# Patient Record
Sex: FEMALE | Race: WHITE | NOT HISPANIC OR LATINO | Employment: OTHER | URBAN - METROPOLITAN AREA
[De-identification: names, ages, dates, MRNs, and addresses within clinical notes are randomized per-mention and may not be internally consistent; named-entity substitution may affect disease eponyms.]

---

## 2021-12-19 DIAGNOSIS — Z11.59 ENCOUNTER FOR SCREENING FOR OTHER VIRAL DISEASES: ICD-10-CM

## 2022-01-12 RX ORDER — ERGOCALCIFEROL 1.25 MG/1
50000 CAPSULE, LIQUID FILLED ORAL DAILY
COMMUNITY

## 2022-01-12 RX ORDER — MELATONIN 10 MG
6-10 CAPSULE ORAL
COMMUNITY

## 2022-01-12 RX ORDER — MULTIPLE VITAMINS W/ MINERALS TAB 9MG-400MCG
1 TAB ORAL DAILY
COMMUNITY

## 2022-01-13 ENCOUNTER — HOSPITAL ENCOUNTER (OUTPATIENT)
Facility: CLINIC | Age: 52
Discharge: HOME | End: 2022-01-13
Attending: OTOLARYNGOLOGY | Admitting: OTOLARYNGOLOGY

## 2022-01-13 ENCOUNTER — ANESTHESIA (OUTPATIENT)
Dept: SURGERY | Facility: CLINIC | Age: 52
End: 2022-01-13

## 2022-01-13 ENCOUNTER — ANESTHESIA EVENT (OUTPATIENT)
Dept: SURGERY | Facility: CLINIC | Age: 52
End: 2022-01-13

## 2022-01-13 VITALS
WEIGHT: 113 LBS | DIASTOLIC BLOOD PRESSURE: 98 MMHG | HEART RATE: 82 BPM | BODY MASS INDEX: 20.02 KG/M2 | OXYGEN SATURATION: 97 % | RESPIRATION RATE: 16 BRPM | HEIGHT: 63 IN | SYSTOLIC BLOOD PRESSURE: 152 MMHG | TEMPERATURE: 98.3 F

## 2022-01-13 DIAGNOSIS — R23.8 FACIAL AGING: Primary | ICD-10-CM

## 2022-01-13 PROCEDURE — 272N000001 HC OR GENERAL SUPPLY STERILE: Performed by: OTOLARYNGOLOGY

## 2022-01-13 PROCEDURE — 250N000011 HC RX IP 250 OP 636: Performed by: OTOLARYNGOLOGY

## 2022-01-13 PROCEDURE — 250N000011 HC RX IP 250 OP 636: Performed by: ANESTHESIOLOGY

## 2022-01-13 PROCEDURE — 999N000141 HC STATISTIC PRE-PROCEDURE NURSING ASSESSMENT: Performed by: OTOLARYNGOLOGY

## 2022-01-13 PROCEDURE — 250N000011 HC RX IP 250 OP 636: Performed by: NURSE ANESTHETIST, CERTIFIED REGISTERED

## 2022-01-13 PROCEDURE — 360N000076 HC SURGERY LEVEL 3, PER MIN: Performed by: OTOLARYNGOLOGY

## 2022-01-13 PROCEDURE — 710N000009 HC RECOVERY PHASE 1, LEVEL 1, PER MIN: Performed by: OTOLARYNGOLOGY

## 2022-01-13 PROCEDURE — 370N000017 HC ANESTHESIA TECHNICAL FEE, PER MIN: Performed by: OTOLARYNGOLOGY

## 2022-01-13 PROCEDURE — 250N000009 HC RX 250: Performed by: OTOLARYNGOLOGY

## 2022-01-13 PROCEDURE — 250N000009 HC RX 250: Performed by: ANESTHESIOLOGY

## 2022-01-13 PROCEDURE — 250N000009 HC RX 250: Performed by: NURSE ANESTHETIST, CERTIFIED REGISTERED

## 2022-01-13 PROCEDURE — 258N000003 HC RX IP 258 OP 636: Performed by: ANESTHESIOLOGY

## 2022-01-13 PROCEDURE — 258N000003 HC RX IP 258 OP 636: Performed by: OTOLARYNGOLOGY

## 2022-01-13 PROCEDURE — 258N000001 HC RX 258: Performed by: OTOLARYNGOLOGY

## 2022-01-13 PROCEDURE — 710N000012 HC RECOVERY PHASE 2, PER MINUTE: Performed by: OTOLARYNGOLOGY

## 2022-01-13 PROCEDURE — 250N000013 HC RX MED GY IP 250 OP 250 PS 637: Performed by: OTOLARYNGOLOGY

## 2022-01-13 DEVICE — IMPLANTABLE DEVICE: Type: IMPLANTABLE DEVICE | Site: FACE | Status: FUNCTIONAL

## 2022-01-13 RX ORDER — MEPERIDINE HYDROCHLORIDE 25 MG/ML
12.5 INJECTION INTRAMUSCULAR; INTRAVENOUS; SUBCUTANEOUS
Status: DISCONTINUED | OUTPATIENT
Start: 2022-01-13 | End: 2022-01-13 | Stop reason: HOSPADM

## 2022-01-13 RX ORDER — BACITRACIN 500 [USP'U]/G
OINTMENT OPHTHALMIC PRN
Status: DISCONTINUED | OUTPATIENT
Start: 2022-01-13 | End: 2022-01-13 | Stop reason: HOSPADM

## 2022-01-13 RX ORDER — MAGNESIUM HYDROXIDE 1200 MG/15ML
LIQUID ORAL PRN
Status: DISCONTINUED | OUTPATIENT
Start: 2022-01-13 | End: 2022-01-13 | Stop reason: HOSPADM

## 2022-01-13 RX ORDER — ONDANSETRON 4 MG/1
4 TABLET, ORALLY DISINTEGRATING ORAL EVERY 30 MIN PRN
Status: DISCONTINUED | OUTPATIENT
Start: 2022-01-13 | End: 2022-01-13 | Stop reason: HOSPADM

## 2022-01-13 RX ORDER — ONDANSETRON 2 MG/ML
INJECTION INTRAMUSCULAR; INTRAVENOUS PRN
Status: DISCONTINUED | OUTPATIENT
Start: 2022-01-13 | End: 2022-01-13

## 2022-01-13 RX ORDER — OXYCODONE HYDROCHLORIDE 5 MG/1
5 TABLET ORAL EVERY 4 HOURS PRN
Status: DISCONTINUED | OUTPATIENT
Start: 2022-01-13 | End: 2022-01-13 | Stop reason: HOSPADM

## 2022-01-13 RX ORDER — BUPIVACAINE HYDROCHLORIDE 5 MG/ML
INJECTION, SOLUTION EPIDURAL; INTRACAUDAL
Status: DISCONTINUED
Start: 2022-01-13 | End: 2022-01-13 | Stop reason: HOSPADM

## 2022-01-13 RX ORDER — SODIUM CHLORIDE, SODIUM LACTATE, POTASSIUM CHLORIDE, CALCIUM CHLORIDE 600; 310; 30; 20 MG/100ML; MG/100ML; MG/100ML; MG/100ML
INJECTION, SOLUTION INTRAVENOUS CONTINUOUS
Status: DISCONTINUED | OUTPATIENT
Start: 2022-01-13 | End: 2022-01-13 | Stop reason: HOSPADM

## 2022-01-13 RX ORDER — PROPOFOL 10 MG/ML
INJECTION, EMULSION INTRAVENOUS PRN
Status: DISCONTINUED | OUTPATIENT
Start: 2022-01-13 | End: 2022-01-13

## 2022-01-13 RX ORDER — EPINEPHRINE 1 MG/ML
INJECTION, SOLUTION INTRAMUSCULAR; SUBCUTANEOUS
Status: DISCONTINUED
Start: 2022-01-13 | End: 2022-01-13 | Stop reason: HOSPADM

## 2022-01-13 RX ORDER — FENTANYL CITRATE 0.05 MG/ML
50 INJECTION, SOLUTION INTRAMUSCULAR; INTRAVENOUS
Status: DISCONTINUED | OUTPATIENT
Start: 2022-01-13 | End: 2022-01-13 | Stop reason: HOSPADM

## 2022-01-13 RX ORDER — KETAMINE HYDROCHLORIDE 10 MG/ML
INJECTION INTRAMUSCULAR; INTRAVENOUS PRN
Status: DISCONTINUED | OUTPATIENT
Start: 2022-01-13 | End: 2022-01-13

## 2022-01-13 RX ORDER — CHLORHEXIDINE GLUCONATE ORAL RINSE 1.2 MG/ML
SOLUTION DENTAL PRN
Status: DISCONTINUED | OUTPATIENT
Start: 2022-01-13 | End: 2022-01-13 | Stop reason: HOSPADM

## 2022-01-13 RX ORDER — ONDANSETRON 2 MG/ML
4 INJECTION INTRAMUSCULAR; INTRAVENOUS
Status: DISCONTINUED | OUTPATIENT
Start: 2022-01-13 | End: 2022-01-13 | Stop reason: HOSPADM

## 2022-01-13 RX ORDER — BUPIVACAINE HYDROCHLORIDE AND EPINEPHRINE 5; 5 MG/ML; UG/ML
INJECTION, SOLUTION PERINEURAL PRN
Status: DISCONTINUED | OUTPATIENT
Start: 2022-01-13 | End: 2022-01-13 | Stop reason: HOSPADM

## 2022-01-13 RX ORDER — GINSENG 100 MG
CAPSULE ORAL
Status: DISCONTINUED
Start: 2022-01-13 | End: 2022-01-13 | Stop reason: HOSPADM

## 2022-01-13 RX ORDER — CEFAZOLIN SODIUM 2 G/100ML
2 INJECTION, SOLUTION INTRAVENOUS SEE ADMIN INSTRUCTIONS
Status: DISCONTINUED | OUTPATIENT
Start: 2022-01-13 | End: 2022-01-13 | Stop reason: HOSPADM

## 2022-01-13 RX ORDER — FENTANYL CITRATE 50 UG/ML
25 INJECTION, SOLUTION INTRAMUSCULAR; INTRAVENOUS
Status: DISCONTINUED | OUTPATIENT
Start: 2022-01-13 | End: 2022-01-13 | Stop reason: HOSPADM

## 2022-01-13 RX ORDER — ONDANSETRON 2 MG/ML
4 INJECTION INTRAMUSCULAR; INTRAVENOUS EVERY 30 MIN PRN
Status: DISCONTINUED | OUTPATIENT
Start: 2022-01-13 | End: 2022-01-13 | Stop reason: HOSPADM

## 2022-01-13 RX ORDER — FENTANYL CITRATE 50 UG/ML
INJECTION, SOLUTION INTRAMUSCULAR; INTRAVENOUS PRN
Status: DISCONTINUED | OUTPATIENT
Start: 2022-01-13 | End: 2022-01-13

## 2022-01-13 RX ORDER — EPHEDRINE SULFATE 50 MG/ML
INJECTION, SOLUTION INTRAMUSCULAR; INTRAVENOUS; SUBCUTANEOUS PRN
Status: DISCONTINUED | OUTPATIENT
Start: 2022-01-13 | End: 2022-01-13

## 2022-01-13 RX ORDER — HYDROMORPHONE HCL IN WATER/PF 6 MG/30 ML
0.2 PATIENT CONTROLLED ANALGESIA SYRINGE INTRAVENOUS EVERY 5 MIN PRN
Status: DISCONTINUED | OUTPATIENT
Start: 2022-01-13 | End: 2022-01-13 | Stop reason: HOSPADM

## 2022-01-13 RX ORDER — FENTANYL CITRATE 50 UG/ML
50 INJECTION, SOLUTION INTRAMUSCULAR; INTRAVENOUS EVERY 5 MIN PRN
Status: DISCONTINUED | OUTPATIENT
Start: 2022-01-13 | End: 2022-01-13 | Stop reason: HOSPADM

## 2022-01-13 RX ORDER — LIDOCAINE HYDROCHLORIDE 20 MG/ML
INJECTION, SOLUTION INFILTRATION; PERINEURAL PRN
Status: DISCONTINUED | OUTPATIENT
Start: 2022-01-13 | End: 2022-01-13

## 2022-01-13 RX ORDER — PROPOFOL 10 MG/ML
INJECTION, EMULSION INTRAVENOUS CONTINUOUS PRN
Status: DISCONTINUED | OUTPATIENT
Start: 2022-01-13 | End: 2022-01-13

## 2022-01-13 RX ORDER — CEFAZOLIN SODIUM 2 G/100ML
2 INJECTION, SOLUTION INTRAVENOUS
Status: COMPLETED | OUTPATIENT
Start: 2022-01-13 | End: 2022-01-13

## 2022-01-13 RX ORDER — LIDOCAINE HYDROCHLORIDE 10 MG/ML
INJECTION, SOLUTION EPIDURAL; INFILTRATION; INTRACAUDAL; PERINEURAL
Status: DISCONTINUED
Start: 2022-01-13 | End: 2022-01-13 | Stop reason: HOSPADM

## 2022-01-13 RX ADMIN — Medication 12 MCG: at 14:52

## 2022-01-13 RX ADMIN — FENTANYL CITRATE 100 MCG: 50 INJECTION, SOLUTION INTRAMUSCULAR; INTRAVENOUS at 07:40

## 2022-01-13 RX ADMIN — PROPOFOL 30 MG: 10 INJECTION, EMULSION INTRAVENOUS at 14:59

## 2022-01-13 RX ADMIN — ONDANSETRON 4 MG: 2 INJECTION INTRAMUSCULAR; INTRAVENOUS at 16:21

## 2022-01-13 RX ADMIN — FENTANYL CITRATE 50 MCG: 50 INJECTION, SOLUTION INTRAMUSCULAR; INTRAVENOUS at 11:24

## 2022-01-13 RX ADMIN — Medication 15 MG: at 13:15

## 2022-01-13 RX ADMIN — Medication 5 MG: at 08:08

## 2022-01-13 RX ADMIN — PROPOFOL 30 MG: 10 INJECTION, EMULSION INTRAVENOUS at 12:43

## 2022-01-13 RX ADMIN — LIDOCAINE HYDROCHLORIDE 100 MG: 20 INJECTION, SOLUTION INFILTRATION; PERINEURAL at 07:40

## 2022-01-13 RX ADMIN — Medication 15 MG: at 14:09

## 2022-01-13 RX ADMIN — FENTANYL CITRATE 50 MCG: 50 INJECTION, SOLUTION INTRAMUSCULAR; INTRAVENOUS at 09:39

## 2022-01-13 RX ADMIN — ROCURONIUM BROMIDE 50 MG: 50 INJECTION, SOLUTION INTRAVENOUS at 07:41

## 2022-01-13 RX ADMIN — PHENYLEPHRINE HYDROCHLORIDE 100 MCG: 10 INJECTION INTRAVENOUS at 07:55

## 2022-01-13 RX ADMIN — Medication 8 MCG: at 13:01

## 2022-01-13 RX ADMIN — CEFAZOLIN SODIUM 2 G: 2 INJECTION, SOLUTION INTRAVENOUS at 15:25

## 2022-01-13 RX ADMIN — PROPOFOL 50 MG: 10 INJECTION, EMULSION INTRAVENOUS at 07:42

## 2022-01-13 RX ADMIN — PROPOFOL 150 MG: 10 INJECTION, EMULSION INTRAVENOUS at 07:40

## 2022-01-13 RX ADMIN — ONDANSETRON 4 MG: 2 INJECTION INTRAMUSCULAR; INTRAVENOUS at 15:50

## 2022-01-13 RX ADMIN — HYDROMORPHONE HYDROCHLORIDE 0.2 MG: 0.2 INJECTION, SOLUTION INTRAMUSCULAR; INTRAVENOUS; SUBCUTANEOUS at 16:58

## 2022-01-13 RX ADMIN — SODIUM CHLORIDE, POTASSIUM CHLORIDE, SODIUM LACTATE AND CALCIUM CHLORIDE: 600; 310; 30; 20 INJECTION, SOLUTION INTRAVENOUS at 06:35

## 2022-01-13 RX ADMIN — ONDANSETRON 4 MG: 2 INJECTION INTRAMUSCULAR; INTRAVENOUS at 18:02

## 2022-01-13 RX ADMIN — CEFAZOLIN SODIUM 2 G: 2 INJECTION, SOLUTION INTRAVENOUS at 11:25

## 2022-01-13 RX ADMIN — SODIUM CHLORIDE, POTASSIUM CHLORIDE, SODIUM LACTATE AND CALCIUM CHLORIDE: 600; 310; 30; 20 INJECTION, SOLUTION INTRAVENOUS at 15:15

## 2022-01-13 RX ADMIN — HYDROMORPHONE HYDROCHLORIDE 0.2 MG: 0.2 INJECTION, SOLUTION INTRAMUSCULAR; INTRAVENOUS; SUBCUTANEOUS at 17:25

## 2022-01-13 RX ADMIN — PROPOFOL 20 MG: 10 INJECTION, EMULSION INTRAVENOUS at 13:10

## 2022-01-13 RX ADMIN — PROPOFOL 200 MCG/KG/MIN: 10 INJECTION, EMULSION INTRAVENOUS at 07:40

## 2022-01-13 RX ADMIN — MIDAZOLAM 2 MG: 1 INJECTION INTRAMUSCULAR; INTRAVENOUS at 07:29

## 2022-01-13 RX ADMIN — HYDROMORPHONE HYDROCHLORIDE 0.5 MG: 1 INJECTION, SOLUTION INTRAMUSCULAR; INTRAVENOUS; SUBCUTANEOUS at 10:48

## 2022-01-13 RX ADMIN — SODIUM CHLORIDE, POTASSIUM CHLORIDE, SODIUM LACTATE AND CALCIUM CHLORIDE: 600; 310; 30; 20 INJECTION, SOLUTION INTRAVENOUS at 10:36

## 2022-01-13 RX ADMIN — CEFAZOLIN SODIUM 2 G: 2 INJECTION, SOLUTION INTRAVENOUS at 07:28

## 2022-01-13 ASSESSMENT — LIFESTYLE VARIABLES: TOBACCO_USE: 0

## 2022-01-13 ASSESSMENT — ENCOUNTER SYMPTOMS
DYSRHYTHMIAS: 0
SEIZURES: 0
ORTHOPNEA: 0

## 2022-01-13 ASSESSMENT — COPD QUESTIONNAIRES: COPD: 0

## 2022-01-13 ASSESSMENT — MIFFLIN-ST. JEOR: SCORE: 1106.69

## 2022-01-13 NOTE — BRIEF OP NOTE
[facility] Brief Operative Note    Pre-operative diagnosis: Facial aging [R23.8]   Post-operative diagnosis Same   Procedure: Procedure(s):  COSMETIC FACELIFT  COSMETIC NECK LIFT  COSMETIC ENDOSCOPIC BROWLIFT  COSMETIC FAT TRANSFER FROM TRUNK TO FACE   Surgeon: Richard Floyd MD   Assistants(s): None   Estimated blood loss: 50 cc    Specimens: Facial and neck skin, not sent   Findings: 2- Endotine 3,5mm Forehead fixation devices placed:  2- 7mm flat Jesus-Su drains place post-auricularly     Complications: none  Condition at end of case: good

## 2022-01-13 NOTE — OR NURSING
"Pt complaining that \"this is not going well\". I asked her what is not going well and she said the crackers are too dry and she needs water and that I am too loud.  "

## 2022-01-13 NOTE — ANESTHESIA PREPROCEDURE EVALUATION
Anesthesia Pre-Procedure Evaluation    Patient: Shonda Rodriguez   MRN: [id number] : 1970        Preoperative Diagnosis: Facial aging [R23.8]    Procedure : Procedure(s):  COSMETIC FACELIFT  COSMETIC NECK LIFT  COSMETIC ENDOSCOPIC BROWLIFT  COSMETIC FAT TRANSFER FROM TRUNK TO FACE          Past Medical History:   Diagnosis Date     Acute diffuse otitis externa of both ears      De Quervain's tenosynovitis, left wrist      Lumbar stenosis      Premature menopause       Past Surgical History:   Procedure Laterality Date     D & C       EXCISE GANGLION HAND      wrist     HEMILAMINOTOMY LUMBAR SPINE       LAPAROSCOPY DIAGNOSTIC (GENERAL)       RHINOPLASTY        No Known Allergies   Social History     Tobacco Use     Smoking status: Former Smoker     Types: Cigarettes     Smokeless tobacco: Never Used   Substance Use Topics     Alcohol use: Yes     Comment: 1-2 per mo      Wt Readings from Last 1 Encounters:   No data found for Wt        Prior to Admission medications    Medication Sig Start Date End Date Taking? Authorizing Provider   DHEA 10 MG CAPS Take 2 capsules by mouth daily   Yes Reported, Patient   Melatonin 10 MG CAPS Take 6-10 mg by mouth nightly as needed   Yes Reported, Patient   Minocycline HCl (MINOCIN PO)    Yes Reported, Patient   Multiple Vitamins-Minerals (ZINC PO) Take 140 mg by mouth daily   Yes Reported, Patient   multivitamin w/minerals (MULTI-VITAMIN) tablet Take 1 tablet by mouth daily   Yes Reported, Patient   vitamin D2 (ERGOCALCIFEROL) 86591 units (1250 mcg) capsule Take 50,000 Units by mouth daily   Yes Reported, Patient     Anesthesia Evaluation   Pt has had prior anesthetic. Type: General.    No history of anesthetic complications       ROS/MED HX  ENT/Pulmonary:     (+) allergic rhinitis,  (-) tobacco use, asthma, COPD, sleep apnea and recent URI   Neurologic:    (-) no seizures and no CVA   Cardiovascular:    (-) angina, hypertension, CAD, CHF, [place], orthopnea/PND, syncope,  arrhythmias, irregular heartbeat/palpitations, valvular problems/murmurs, angina, murmur and wheezes   METS/Exercise Tolerance: >4 METS    Hematologic:     (+) history of blood transfusion, no previous transfusion reaction,     Musculoskeletal: Comment: S/p Cervical Disc Replacement C5-6      GI/Hepatic:    (-) GERD and liver disease   Renal/Genitourinary:    (-) renal disease   Endo:    (-) Type II DM, thyroid disease and chronic steroid usage   Psychiatric/Substance Use:    (-) alcohol abuse history   Infectious Disease:       Malignancy:       Other:            Physical Exam    Airway        Mallampati: I   TM distance: > 3 FB   Neck ROM: full   Mouth opening: > 3 cm    Respiratory Devices and Support         Dental     Comment: Veneers      B=Bridge, C=Chipped, L=Loose, M=Missing    Cardiovascular          Rhythm and rate: regular and normal (-) no murmur    Pulmonary           breath sounds clear to auscultation   (-) no rhonchi and no wheezes        OUTSIDE LABS:    Anesthesia Plan    ASA Status:  1   NPO Status:  NPO Appropriate    Anesthesia Type: General.     - Airway: ETT   Induction: Propofol, Intravenous.   Maintenance: TIVA.        Consents    Anesthesia Plan(s) and associated risks, benefits, and realistic alternatives discussed. Questions answered and patient/representative(s) expressed understanding.    - Discussed:     - Discussed with:  Patient         Postoperative Care    Pain management: Multi-modal analgesia.   PONV prophylaxis: Ondansetron (or other 5HT-3), Dexamethasone or Solumedrol     Comments:    Other Comments: Surgeon requests TIVA   Zofran 8 mg (divided over last 30 minutes of case)     Pt requests to not have Fentanyl in recovery            Dago Reich MD

## 2022-01-13 NOTE — ANESTHESIA CARE TRANSFER NOTE
Patient: Shonda Rodriguez    Procedure: Procedure(s):  COSMETIC FACELIFT  COSMETIC NECK LIFT  COSMETIC ENDOSCOPIC BROWLIFT  COSMETIC FAT TRANSFER FROM TRUNK TO FACE       Diagnosis: Facial aging [R23.8]  Diagnosis Additional Information: No value filed.    Anesthesia Type:   General     Note:    Oropharynx: oropharynx clear of all foreign objects and spontaneously breathing  Level of Consciousness: awake  Oxygen Supplementation: face mask  Level of Supplemental Oxygen (L/min / FiO2): 6  Independent Airway: airway patency satisfactory and stable  Dentition: dentition unchanged  Vital Signs Stable: post-procedure vital signs reviewed and stable  Report to RN Given: handoff report given  Patient transferred to: PACU  Comments: Neuromuscular blockade not used after succinylcholine for intubation, spontaneous return of TOF 4/4 with sustained tetany, spontaneous respirations, adequate tidal volumes, followed commands to voice, oropharynx suctioned with soft flexible catheter, extubated atraumatically, extubated with suction, airway patent after extubation.  Oxygen via facemask at 6 liters per minute to PACU. Oxygen tubing connected to wall O2 in PACU, SpO2, NiBP, and EKG monitors and alarms on and functioning, Juanis Hugger warmer connected to patient gown, report on patient's clinical status given to PACU RN, RN questions answered.     Handoff Report: Identifed the Patient, Identified the Reponsible Provider, Reviewed the pertinent medical history, Discussed the surgical course, Reviewed Intra-OP anesthesia mangement and issues during anesthesia, Set expectations for post-procedure period and Allowed opportunity for questions and acknowledgement of understanding      Vitals:  Vitals Value Taken Time   /98 01/13/22 1631   Temp     Pulse 70 01/13/22 1637   Resp 7 01/13/22 1637   SpO2 100 % 01/13/22 1637   Vitals shown include unvalidated device data.    Electronically Signed By: VINCENT Toney CRNA  January  13, 2022  4:38 PM

## 2022-01-13 NOTE — ANESTHESIA PROCEDURE NOTES
Airway       Patient location during procedure: OR       Procedure Start/Stop Times: 1/13/2022 7:46 AM  Staff -        Anesthesiologist:  Dago Reich MD       CRNA: Franny Carey APRN CRNA       Other Anesthesia Staff: Antonietta Cunningham       Performed By: anesthesiologist, CRNA and SRNA  Consent for Airway        Urgency: elective  Indications and Patient Condition       Indications for airway management: katelyn-procedural       Induction type:intravenous       Mask difficulty assessment: 1 - vent by mask    Final Airway Details       Final airway type: endotracheal airway       Successful airway: ETT - single  Endotracheal Airway Details        ETT size (mm): 7.0       Cuffed: yes       Successful intubation technique: video laryngoscopy       VL Blade Size: Glidescope 3       Grade View of Cords: 1       Adjucts: stylet       Position: Right       Measured from: gums/teeth       Secured at (cm): 20       Bite block used: None    Post intubation assessment        Placement verified by: capnometry, equal breath sounds and chest rise        Number of attempts at approach: 1       Number of other approaches attempted: 0       Secured with: other (comment) (Surgeon sutured ETT to front tooth)       Ease of procedure: easy       Dentition: Intact and Unchanged

## 2022-01-13 NOTE — OR NURSING
Pt has been nauseated on and off. After cracker (couple bites) c/o nausea again. zofran given. Surgeon at bedside talking to pt.

## 2022-01-14 NOTE — OR NURSING
Pt and  notified that they are not to drain the radha drains but to call surgeon if they fill with blood.

## 2022-01-14 NOTE — OR NURSING
"Throughout care pt has been very condescending, demeaning and rude to staff.      as noted in note from note at 1758 when pt c/o my talking too loudly, when surgeon spoke louder to her closer to her ear she did not complain about him speaking loudly.     Pt would state \"well get me someone who knows\" when I explained that I was not sure about the length of time of effect of IV zofran telling her that it affects everyone differently. I did get her the information as I had told her I would.    Pt complaining of the housekeeping staff when they were doing their job.    After talking to Dr. Kahn about nausea meds for the current episode pt said, \"I don't know why you are telling people that I get nauseated with surgeries. This is the first time this has ever happened after any of the surgeries I have ever done.\" I advised her that I did not say anything resembling that and I was only discussing the current situation.    Throughout care when pt was complaining of nausea or pain or trying to decide what to eat she would say, again in rude fashion, \"I don't know what I want\". I would give her choices and wait for her to decide on her choice.     Throughout care I attempted to use therapeutic communication but it did not seem to make a difference.     This behavior was noted by many staff members   "

## 2022-01-14 NOTE — OR NURSING
"Pt was nauseaterd and Dr riley notified. Ordered compazine 5 mg. As Iwas about to give it pt refused any more meds saying \"I just want to go, this is going south with every intervention that you do\".   "

## 2022-01-14 NOTE — OP NOTE
Procedure Date: 01/13/2022    SURGEON:  Richard Floyd MD.     PREOPERATIVE DIAGNOSIS:  Facial aging.    POSTOPERATIVE DIAGNOSIS:  Facial aging.    PROCEDURES PERFORMED:    1.  Endoscopic brow lift.  2.  Facelift and neck lift.  3.  Truncal fat transfer to the face.    ANESTHESIA:  IV general anesthesia.    ESTIMATED BLOOD LOSS:  50 mL.     COMPLICATIONS:  There were no complications.    IMPLANTS PLACED:  Two 3.5 mm Endotine brow fixation devices and two 7 mm flat Jesus-Su drains placed behind each ear.    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  She has had a previous anterior cervical disk operation through the front of her right neck and then also she has had 3 treatments of Kybella in the past.  The risks, benefits serious and significant complications and alternatives to the above-named procedures were explained to the patient and informed consent in the office. Today she had preoperative markings of her pre and postauricular incisions, her submental incision, the markings for fat transfer on her face, also she had markings for the origin of the supratrochlear and supraorbital nerve and then she had markings in the paramedian and temple incisions for her brow lift.    The patient was then taken to the operating room, placed supine on the operating table, induced under general endotracheal anesthesia.  She was then prepped and draped from the xiphoid down to below the knees for the abdominal fat harvest.  Four puncture sites were made in the umbilical area and then 10 mL of fat was then harvested for each of the 4 quadrants.  Then, the 4 umbilical incisions were closed with a 6-0 fast absorbing gut.  After this was done, 2 puncture wounds with 16-gauge needle were done on both the inner and outer thigh on each leg and then 3 mL were harvested from the inner thigh  compartment on each side and 3 mL of fat was then harvested from the lateral thigh compartment for a total of 12 mL harvested from the inner and outer thighs total.  There were no sutures through the puncture wounds and these were just covered with Steri-Strips.  The fat was then processed with the centrifuge and then the patient's face was then prepped and draped.  It should be mentioned that the patient had a nasal swab with Betadine for 10 seconds on each nostril x2 and the patient also had chlorhexidine oral rinse.  At this time, blunt fat transfer cannulas were used to transfer 5 mL into the area of the chin region, 1 mL to the upper lip, 1 mL of the lower lip, 1 mL to each of the anterior medial cheeks, 1 mL to the lateral cheeks and 1 mL to the nasolabial fold at the base of the nose.  The patient also had 0.5 mL placed in each of the ear lobes in the lower portion on both sides.  It should be mentioned, the patient has a previous cleft of the right earlobe.  The patient also had the remaining fat converted to nanofat and 4 mL of nanofat was placed with a 25-gauge needle with 2 mL in the upper lip and 2 mL in the lower lip.    After this was done, the patient was prepared for an endoscopic brow lift with local injection at the superior orbital margin and also at the 4 scalp incisions.  Two scalp incisions were made in the paramedian area.  Two scalp incisions were made in the temple region.  In the paramedian areas, the dissection was carried under the periosteum to within 2 cm of the orbital rim on both sides and then brought laterally to the conjoined tendon. In the temple region, the incision was carried down to the deep temporal fascia and at this time, a dissection through the deep temporal fascias of the conjoined fascia was done and the dissection pockets were joined.  The orbital ligaments were released on the lateral aspect of each of the orbits and then dissection of the arcus marginalis was done  with careful preservation of the supraorbital nerve roots.  At this time, no muscle work was done.  There was just elevation of the central muscular segment between the eyebrows, but again no cutting or cautery was done in this region.  At this time, the lateral brows were raised with an Endotine fixation device, 3.5 mm, placed into the bone on either right and left side and then the brows were elevated and then secured onto the Endotine fixation device.  The wound was closed in 2 layers with 4-0 Vicryl suture and a staple closure. In the lateral temporal region, the superficial temporal fascia was advanced on the deep temporal fascia with a 2-0 Vicryl suture and then a stapled closure was done.  There was good elevation of both of the brows.  At this time, the attention was then directed to the neck.  The neck was injected with tumescent anesthesia and then an incision was made in the submental crease.  The dissection was done in the subcutaneous plane immediately above the platysma muscles and was carried down from the chin area down to the sternal notch.  It should be mentioned that the patient had a prior anterior neck cervical disk surgery and then the incision was still fresh and there is some scarring in this region.  There is also some scarring immediately in the submental region from the previously placed Kybella.  All this was raised with the subcutaneous plane and then the platysmal muscle bellies were exposed.  At this time, 3-0 PDS was then used to secure the muscle from the chin down to the suprasternal notch and then back upward to the menton.  This was done and as a Bridges style platysmaplasty.  Following this, the dissection was carried laterally to the anterior border of the sternocleidomastoid and the procedure was then paused as the facelift was performed.  The face of procedure was begun on the right with a preinjection of local and also tumescent anesthesia.  A temporal tuft incision in the  preauricular, postauricular and along the hairline in the mastoid region was done.  The tissue was elevated in a subcutaneous plane to approximately 3-4 cm into the cheek in front of the ear and then the mastoid flap was elevated with the subcutaneous fat tissue above and then the fascia in the cervical region posteriorly over the sternocleidomastoid muscle was then left intact.  At no time was the greater auricular nerve seen or compromised.  This dissection postauricularly was carried into the neck in the subcutaneous plane and joined into the anterior neck dissection and then the neck dissection was carried, as mentioned, 3-4 cm out in front of the ear.  Vertical platysma suspension was done from the temporalis fascia along the preauricular margin and grabbing some of the platysma muscle, approximately 2 fingerbreadths below the mandible, and then elevating it in a purely vertical fashion tight to the temporal fascia.  After this was done, a mass imbrication from the body of the zygoma to the angle of the mandible was done with a 3-0 PDS suture and this was done in 2 layers.  The skin was then redraped and then excess skin in the face and neck were then excised.  A 7 mm flat Penrose drain was placed postauricularly and the wound was closed in 2 layers with a 5-0 Vicryl and a 6-0 Prolene suture.  A similar procedure was repeated on the opposite left side with the same pretumescent and prelocal anesthesia followed by a subcutaneous dissection 3-4 cm in front of the ear and then very careful dissection in the postauricular region to lift off the subcutaneous tissue off the mastoid and leaving the cervical fascia intact, so as not to injure the greater auricular nerve, and this was carried in to the neck, joining the other subcutaneous dissection for mobilization.  The same 3-0 PDS was used to elevate the lateral platysma directly vertically and then the mass imbrication was done from the body of the zygoma to the  angle of the mandible in 2 layers.  Following this, a 7 flat Jesus-Su drain was then placed into the neck through a postauricular incision and then the wound was closed in a similar fashion.  At the end of the case, a hair wash was done.  It should be mentioned that there was some superficial bleeding from the skin areas from the left temporal incision and that was reopened, cauterized and then closed with staples again and was completely dry.  The submental wound was closed also in 2 layers with a 5-0 Vicryl and a 6-0 Prolene suture and covered with a Steri-Strip.  Telfa with bacitracin, fluff, Kerlix and Coban dressing was then placed.  The drains were then taped to the outside of the dressing and the patient was taken to PAR in good condition.  There were no complications.  As mentioned, the estimated blood loss was less than 50 mL.    Richard Floyd MD        D: 2022   T: 2022   MT: [id number]    Name:     SANDOVAL HARTMAN  MRN:      [id number]        Account:        [id number]   :      1970           Procedure Date: 2022     Document: [id number]

## 2022-01-14 NOTE — OR NURSING
Pt while trying to get up said she was nauseated again, waited for it to pass and then she said it was ok for us to get her up. I had the assistance of a female rn in the room as well.

## 2022-01-14 NOTE — ANESTHESIA POSTPROCEDURE EVALUATION
Patient: Shonda Rodriguez    Procedure: Procedure(s):  COSMETIC FACELIFT  COSMETIC NECK LIFT  COSMETIC ENDOSCOPIC BROWLIFT  COSMETIC FAT TRANSFER FROM TRUNK TO FACE       Diagnosis:Facial aging [R23.8]  Diagnosis Additional Information: No value filed.    Anesthesia Type:  General    Note:     Postop Pain Control: Uneventful            Sign Out: Well controlled pain   PONV: No   Neuro/Psych: Uneventful            Sign Out: Acceptable/Baseline neuro status   Airway/Respiratory: Uneventful            Sign Out: Acceptable/Baseline resp. status   CV/Hemodynamics: Uneventful            Sign Out: Acceptable CV status   Other NRE: NONE   DID A NON-ROUTINE EVENT OCCUR? No           Last vitals:  Vitals Value Taken Time   /93 01/13/22 1804   Temp 36.8  C (98.3  F) 01/13/22 1735   Pulse 79 01/13/22 1815   Resp 7 01/13/22 1815   SpO2 97 % 01/13/22 1815   Vitals shown include unvalidated device data.    Electronically Signed By: Jamil Kahn MD  January 13, 2022  6:17 PM

## 2022-01-14 NOTE — OR NURSING
Talking to her about getting her home and let her go to bed. Explaining to her about being nauseated after long anesthesia is normal and will take time to go away and that she will be able to drink sips and eat little bit when stomach growls.

## 2022-01-14 NOTE — DISCHARGE INSTRUCTIONS
Same Day Surgery Discharge Instructions for  Sedation and General Anesthesia       It's not unusual to feel dizzy, light-headed or faint for up to 24 hours after surgery or while taking pain medication.  If you have these symptoms: sit for a few minutes before standing and have someone assist you when you get up to walk or use the bathroom.      You should rest and relax for the next 24 hours. We recommend you make arrangements to have an adult stay with you for at least 24 hours after your discharge.  Avoid hazardous and strenuous activity.      DO NOT DRIVE any vehicle or operate mechanical equipment for 24 hours following the end of your surgery.  Even though you may feel normal, your reactions may be affected by the medication you have received.      Do not drink alcoholic beverages for 24 hours following surgery.       Slowly progress to your regular diet as you feel able. It's not unusual to feel nauseated and/or vomit after receiving anesthesia.  If you develop these symptoms, drink clear liquids (apple juice, ginger ale, broth, 7-up, etc. ) until you feel better.  If your nausea and vomiting persists for 24 hours, please notify your surgeon.        All narcotic pain medications, along with inactivity and anesthesia, can cause constipation. Drinking plenty of liquids and increasing fiber intake will help.      For any questions of a medical nature, call your surgeon.      Do not make important decisions for 24 hours.      If you had general anesthesia, you may have a sore throat for a couple of days related to the breathing tube used during surgery.  You may use Cepacol lozenges to help with this discomfort.  If it worsens or if you develop a fever, contact your surgeon.       If you feel your pain is not well managed with the pain medications prescribed by your surgeon, please contact your surgeon's office to let them know so they can address your concerns.       CoVid 19 Information    We want to give you  information regarding Covid. Please consult your primary care provider with any questions you might have.     Patient who have symptoms (cough, fever, or shortness of breath), need to isolate for 7 days from when symptoms started OR 72 hours after fever resolves (without fever reducing medications) AND improvement of respiratory symptoms (whichever is longer).      Isolate yourself at home (in own room/own bathroom if possible)    Do Not allow any visitors    Do Not go to work or school    Do Not go to [place],  centers, shopping, or other public places.    Do Not shake hands.    Avoid close and intimate contact with others (hugging, kissing).    Follow CDC recommendations for household cleaning of frequently touched services.     After the initial 7 days, continue to isolate yourself from household members as much as possible. To continue decrease the risk of community spread and exposure, you and any members of your household should limit activities in public for 14 days after starting home isolation.     You can reference the following CDC link for helpful home isolation/care tips:  https://www.cdc.gov/coronavirus/2019-ncov/downloads/10Things.pdf    Protect Others:    Cover Your Mouth and Nose with a mask, disposable tissue or wash cloth to avoid spreading germs to others.    Wash your hands and face frequently with soap and water    Call Your Primary Doctor If: Breathing difficulty develops or you become worse.    For more information about COVID19 and options for caring for yourself at home, please visit the CDC website at https://www.cdc.gov/coronavirus/2019-ncov/about/steps-when-sick.html  For more options for care at [facility], please visit our website at [email or web address]        Call Dr. Floyd with any concerns or issues  [phone]

## 2022-01-14 NOTE — OR NURSING
Pt c/o being too cold after complaining of being too hot. I was adjusting the air blower and then took ice off of abd.

## 2022-01-14 NOTE — PROGRESS NOTES
Assisted with phase 2 recovery of pt and discharge. Pt appears frustrated. Making rude and demeaning comments towards staff.

## 2022-01-20 ENCOUNTER — LAB (OUTPATIENT)
Dept: LAB | Facility: CLINIC | Age: 52
End: 2022-01-20
Payer: COMMERCIAL

## 2022-01-20 DIAGNOSIS — R60.0 FLUID COLLECTION (EDEMA) IN THE ARMS, LEGS, HANDS AND FEET: Primary | ICD-10-CM

## 2022-01-20 PROCEDURE — 87077 CULTURE AEROBIC IDENTIFY: CPT

## 2022-01-25 LAB
BACTERIA SPEC CULT: ABNORMAL
BACTERIA SPEC CULT: ABNORMAL

## 2024-01-22 ENCOUNTER — TRANSFERRED RECORDS (OUTPATIENT)
Dept: HEALTH INFORMATION MANAGEMENT | Facility: CLINIC | Age: 54
End: 2024-01-22

## 2024-06-10 ENCOUNTER — TRANSCRIBE ORDERS (OUTPATIENT)
Dept: OTHER | Age: 54
End: 2024-06-10

## 2024-06-10 DIAGNOSIS — M54.16 LUMBAR RADICULOPATHY: Primary | ICD-10-CM

## 2024-07-03 ENCOUNTER — MEDICAL CORRESPONDENCE (OUTPATIENT)
Dept: HEALTH INFORMATION MANAGEMENT | Facility: CLINIC | Age: 54
End: 2024-07-03

## 2024-07-05 ENCOUNTER — TRANSCRIBE ORDERS (OUTPATIENT)
Dept: OTHER | Age: 54
End: 2024-07-05

## 2024-07-05 DIAGNOSIS — M81.0 OSTEOPOROSIS, UNSPECIFIED OSTEOPOROSIS TYPE, UNSPECIFIED PATHOLOGICAL FRACTURE PRESENCE: Primary | ICD-10-CM

## 2024-09-24 NOTE — CONFIDENTIAL NOTE
RECORDS RECEIVED FROM: internal /ce    DATE RECEIVED: 9.30.24    NOTES (FOR ALL VISITS) STATUS DETAILS   OFFICE NOTES from referring provider External     Justin Ren MD   [facility]    OFFICE NOTES from other specialist Ce  [facility]-    ED NOTES     OPERATIVE REPORT  (thyroid, pituitary, adrenal, parathyroid)     MEDICATION LIST internal     IMAGING      DEXASCAN ce    MRI (BRAIN) External  [facility]- 1.22.24    LABS     DIABETES: HBGA1C, CREATININE, FASTING LIPIDS, MICROALBUMIN URINE, POTASSIUM, TSH, T4    THYROID: TSH, T4, CBC, THYRODLONULIN, TOTAL T3, FREE T4, CALCITONIN, CEA internal /ce  Cmp- 10.23.23  Vitamin D- 10.23.23   Thyroperoxidase- 10.23.23  Testosterone total- 10.23.23  Cbc- 10.23.23 \  HBGA1C- 3.17.23  TSH- 3.17.23   Lipid- 3.17.23      Action 9.24.24 sv    Action Taken Image request sent to  for   DX- 6.26.24       Image request sent to [facility]   MRI- 1.22.24       Action 9.27.24 sv    Action Taken 2nd Image request sent to  for   DX- 6.26.24     2nd Image request sent to [facility]   MRI- 1.22.24       Action 9.30.24 sv    Action Taken Called  [facility] and [facility] about Image requests for      DX- 6.26.24       [facility]   MRI- 1.22.24

## 2024-09-30 ENCOUNTER — PRE VISIT (OUTPATIENT)
Dept: ENDOCRINOLOGY | Facility: CLINIC | Age: 54
End: 2024-09-30

## (undated) DEVICE — SU PLAIN 6-0 G-1DA 18" 770G

## (undated) DEVICE — STPL SKIN 35W 6.9MM  PXW35

## (undated) DEVICE — DRAPE MAYO STAND 23X54 8337

## (undated) DEVICE — ESU NDL COLORADO MICRO 3CM STR N103A

## (undated) DEVICE — NDL COUNTER 40CT  31142311

## (undated) DEVICE — CATH FOLEY COUDE 16FR 5ML LATEX

## (undated) DEVICE — LINEN TOWEL PACK X5 5464

## (undated) DEVICE — DRSG STERI STRIP 1/2X4" R1547

## (undated) DEVICE — DRAPE STERI TOWEL LG 1010

## (undated) DEVICE — LIGHT HANDLE X2

## (undated) DEVICE — PEN MARKING SKIN FINE 31145942

## (undated) DEVICE — SPONGE RAY-TEC 4X4" 7317

## (undated) DEVICE — SPONGE BALL KERLIX ROUND XL W/O STRING LATEX 4935

## (undated) DEVICE — Device

## (undated) DEVICE — SU PROLENE 6-0 P-1 18" 8697G

## (undated) DEVICE — DRSG COTTON BALL 6PK LCB62

## (undated) DEVICE — ESU PENCIL W/HOLSTER E2350H

## (undated) DEVICE — PACK MINOR SBA15MIFSE

## (undated) DEVICE — NDL 30GA 1" 305128

## (undated) DEVICE — NDL 25GA 1.25" 8881200433

## (undated) DEVICE — SOL ADH LIQUID BENZOIN SWAB 0.6ML C1544

## (undated) DEVICE — SU VICRYL 4-0 PS-2 18" UND J496H

## (undated) DEVICE — SYR 10ML FINGER CONTROL W/O NDL 309695

## (undated) DEVICE — SU VICRYL 2-0 SH 27" J317H

## (undated) DEVICE — GLOVE PROTEXIS W/NEU-THERA 7.5  2D73TE75

## (undated) DEVICE — GLOVE PROTEXIS W/NEU-THERA 8.0  2D73TE80

## (undated) DEVICE — TUBING SUCTION 12"X1/4" N612

## (undated) DEVICE — SUCTION FRAZIER 12FR W/HANDLE K73

## (undated) DEVICE — DRAIN JACKSON PRATT RESERVOIR 100ML SU130-1305

## (undated) DEVICE — MANIFOLD NEPTUNE 4 PORT 700-20

## (undated) DEVICE — ESU CORD BIPOLAR 12' E0512

## (undated) DEVICE — NDL 18GA 1.5" 305196

## (undated) DEVICE — SPONGE COTTONOID 1/2X3" 80-1407

## (undated) DEVICE — SYR 50ML LL W/O NDL 309653

## (undated) DEVICE — DRSG KERLIX FLUFFS X5

## (undated) DEVICE — DRAIN JACKSON PRATT 07MM FLAT SU130-1310

## (undated) DEVICE — CATH TRAY FOLEY 16FR BARDEX W/DRAIN BAG STATLOCK 300316A

## (undated) DEVICE — BLADE KNIFE SURG 11 371111

## (undated) DEVICE — DRAIN PENROSE 0.25"X18" LATEX FREE GR201

## (undated) DEVICE — SYR 20ML SLIP TIP W/O NDL 302831

## (undated) DEVICE — STPL SKIN PROXIMATE 35 WIDE PMW35

## (undated) DEVICE — SU VICRYL 5-0 P-3 18" UND J493G

## (undated) DEVICE — DRSG STERI STRIP 1/4X3" R1541

## (undated) DEVICE — DRAPE CV SPLIT II 147X106" 9158

## (undated) DEVICE — DRAPE POUCH IRR 1016

## (undated) DEVICE — SYR 10ML LL W/O NDL 302995

## (undated) DEVICE — NDL 20GA 1.5"

## (undated) DEVICE — SU PDS II 3-0 SH 27" Z316H

## (undated) DEVICE — SU SILK 2-0 TIE 24" SA75H

## (undated) DEVICE — TUBING IV EXTENSION SET ANESTHESIA 34" MLL 2C6227

## (undated) DEVICE — STPL SKIN 35W ROTATING HEAD PRW35

## (undated) DEVICE — DRSG TEGADERM 2 1/2X 2 3/4"

## (undated) DEVICE — DRAPE SPLIT EENT 76X124" 3X28" 9447

## (undated) RX ORDER — ONDANSETRON 2 MG/ML
INJECTION INTRAMUSCULAR; INTRAVENOUS
Status: DISPENSED
Start: 2022-01-13

## (undated) RX ORDER — PROPOFOL 10 MG/ML
INJECTION, EMULSION INTRAVENOUS
Status: DISPENSED
Start: 2022-01-13

## (undated) RX ORDER — FENTANYL CITRATE 50 UG/ML
INJECTION, SOLUTION INTRAMUSCULAR; INTRAVENOUS
Status: DISPENSED
Start: 2022-01-13

## (undated) RX ORDER — HYDROMORPHONE HCL IN WATER/PF 6 MG/30 ML
PATIENT CONTROLLED ANALGESIA SYRINGE INTRAVENOUS
Status: DISPENSED
Start: 2022-01-13

## (undated) RX ORDER — LIDOCAINE HYDROCHLORIDE 20 MG/ML
INJECTION, SOLUTION EPIDURAL; INFILTRATION; INTRACAUDAL; PERINEURAL
Status: DISPENSED
Start: 2022-01-13

## (undated) RX ORDER — CEFAZOLIN SODIUM 1 G/3ML
INJECTION, POWDER, FOR SOLUTION INTRAMUSCULAR; INTRAVENOUS
Status: DISPENSED
Start: 2022-01-13

## (undated) RX ORDER — HYDROMORPHONE HYDROCHLORIDE 1 MG/ML
INJECTION, SOLUTION INTRAMUSCULAR; INTRAVENOUS; SUBCUTANEOUS
Status: DISPENSED
Start: 2022-01-13

## (undated) RX ORDER — CEFAZOLIN SODIUM 2 G/100ML
INJECTION, SOLUTION INTRAVENOUS
Status: DISPENSED
Start: 2022-01-13

## (undated) RX ORDER — CHLORHEXIDINE GLUCONATE ORAL RINSE 1.2 MG/ML
SOLUTION DENTAL
Status: DISPENSED
Start: 2022-01-13

## (undated) RX ORDER — KETAMINE HCL IN NACL, ISO-OSM 100MG/10ML
SYRINGE (ML) INJECTION
Status: DISPENSED
Start: 2022-01-13